# Patient Record
Sex: FEMALE | Race: WHITE | Employment: OTHER | URBAN - METROPOLITAN AREA
[De-identification: names, ages, dates, MRNs, and addresses within clinical notes are randomized per-mention and may not be internally consistent; named-entity substitution may affect disease eponyms.]

---

## 2019-05-24 ENCOUNTER — TELEPHONE (OUTPATIENT)
Dept: CARDIOLOGY | Age: 79
End: 2019-05-24

## 2019-05-24 NOTE — TELEPHONE ENCOUNTER
5/24/19 Message left on patient's home answering machine reminder of Nuclear stress test on 5/28 @9266.

## 2019-05-28 ENCOUNTER — HOSPITAL ENCOUNTER (OUTPATIENT)
Dept: CARDIOLOGY | Age: 79
Discharge: HOME | End: 2019-05-28
Payer: MEDICARE

## 2019-05-28 VITALS
DIASTOLIC BLOOD PRESSURE: 70 MMHG | SYSTOLIC BLOOD PRESSURE: 138 MMHG | BODY MASS INDEX: 23.9 KG/M2 | HEART RATE: 66 BPM | OXYGEN SATURATION: 96 % | HEIGHT: 64 IN | WEIGHT: 140 LBS

## 2019-05-28 DIAGNOSIS — R06.02 SOB (SHORTNESS OF BREATH): Primary | ICD-10-CM

## 2019-05-28 DIAGNOSIS — R94.31 ACUTE ELECTROCARDIOGRAM CHANGES: ICD-10-CM

## 2019-05-28 LAB
LV EF: 66 %
LVEF MODALITY: NORMAL

## 2019-05-28 PROCEDURE — A9500 TC99M SESTAMIBI: HCPCS | Performed by: INTERNAL MEDICINE

## 2019-05-28 PROCEDURE — 78452 HT MUSCLE IMAGE SPECT MULT: CPT

## 2019-05-28 PROCEDURE — 93017 CV STRESS TEST TRACING ONLY: CPT

## 2019-05-28 PROCEDURE — 3430000000 HC RX DIAGNOSTIC RADIOPHARMACEUTICAL: Performed by: INTERNAL MEDICINE

## 2019-05-28 PROCEDURE — 2580000003 HC RX 258: Performed by: INTERNAL MEDICINE

## 2019-05-28 RX ORDER — LANOLIN ALCOHOL/MO/W.PET/CERES
1000 CREAM (GRAM) TOPICAL DAILY
COMMUNITY

## 2019-05-28 RX ORDER — SODIUM CHLORIDE 0.9 % (FLUSH) 0.9 %
10 SYRINGE (ML) INJECTION PRN
Status: DISCONTINUED | OUTPATIENT
Start: 2019-05-28 | End: 2019-05-29 | Stop reason: HOSPADM

## 2019-05-28 RX ADMIN — Medication 33.9 MILLICURIE: at 10:17

## 2019-05-28 RX ADMIN — Medication 10 ML: at 10:17

## 2019-05-28 RX ADMIN — Medication 10 ML: at 08:59

## 2019-05-28 RX ADMIN — Medication 10.3 MILLICURIE: at 08:59

## 2019-05-28 NOTE — PROCEDURES
[id number] [address] [facility] [address]  [phone]                Exercise Stress Nuclear Gated SPECT Study    Name: [facility] Account Number: [de-identified]    :  1940      Sex: female              Date of Study:  2019    Height: 5' 4\" (162.6 cm)  Weight: 140 lb (63.5 kg)     Ordering Eliseo Shepard MD   PCP: Danyelle Ji MD      Cardiologist: None                        Interpreting Physician: Elsa Gill DO      Indication:   Detecting the presence and location of coronary artery disease    Clinical History:   Patient has no known history of coronary artery disease. Resting ECG:    Sinus rhythm, 61 bpm. Normal axis. Nonspecific ST-T waves. Exercise: The patient exercised using a John protocol, completing 5:30 minutes and reaching an estimated work load of 7.0 metabolic equivalents (METS). Resting HR was 61. Peak exercise heart rate was 128 ( 90% of maximum predicted heart rate for age). Baseline /70. Peak exercise /76. The blood pressure response to exercise was normal      Exercise was terminated due to increasing dyspnea, leg fatigue, and heart rate attained. The patient experienced no chest pain with exercise. Pulse oximetry was used to monitor oxygen saturation during the stress test.  The study was performed on Room Air. The resting pulse oximeter was 97%. The lowest O2 saturation seen during exercise was 96 %. The average O2 saturation with exercise was 96 %. Exercise ECG:   The patient demonstrated occasional PVC's during exercise. With exercise up to 2 mm of horizontal ST depression was noted in leads II, III, AVF, V4, V5 and V6 with initial changes beginning at 4:57 minutes into exercise, at a heart rate of 120. These changes these changes persisted 6 minutes into recovery period. The predictive value for ischemia was high\"}. Dean treadmill score was - 4.5 implying intermediate risk. IMAGING: Myocardial perfusion imaging was performed at rest 30-35 minutes following the intravenous injection of 10.3 mCi of (Tc-Sestamibi) followed by 10 ml of Normal Saline. At peak exercise, the patient was injected intravenously with 33.9 mCi of (Tc-Sestamibi) followed by 10 ml of Normal Saline. Gated post-stress tomographic imaging was performed 20-25 minutes after stress. FINDINGS: The overall quality of the study was good. Left ventricular cavity size was noted to be normal.    Rotational analog analysis demonstrated soft tissue breast attenuation. The gated SPECT stress imaging in the short, vertical long, and horizontal long axis demonstrated normal homogeneous tracer distribution throughout the myocardium. Gated SPECT left ventricular ejection fraction was calculated to be 66%, with normal myocardial thickening and wall motion. Impression:    1. Exercise EKG was    positive with high predictive value for ischemia. 2. The patient experienced no chest pain with exercise. 3. The myocardial perfusion imaging was normal.      4. Overall left ventricular systolic function was normal without regional wall motion abnormalities. 5. Dean treadmill score was - 4.5 implying intermediate risk. 6. Exercise capacity was average. 7. Intermediate risk general exercise treadmill test.    Thank you for sending your patient to this [employer].      Electronically signed by Peter Arnold DO on 5/28/19 at 2:25 PM

## 2019-05-29 ENCOUNTER — TELEPHONE (OUTPATIENT)
Dept: ADMINISTRATIVE | Age: 79
End: 2019-05-29

## 2019-05-29 NOTE — TELEPHONE ENCOUNTER
Cardiology doctors reviewed today and they stated that this can wait until 1st available next week. Appointment made for Radha 3, 2019 at 8:15 a.m. With Dr. Cornell Hammond. Patient cannot recall exactly what cardiologist she saw about 20 yrs ago. Dr. Srinivas De La Paz notes in [address] [place] [address] Rd.

## 2019-05-31 PROBLEM — R94.39 ABNORMAL STRESS TEST: Status: ACTIVE | Noted: 2019-05-31

## 2019-06-03 ENCOUNTER — OFFICE VISIT (OUTPATIENT)
Dept: CARDIOLOGY CLINIC | Age: 79
End: 2019-06-03
Payer: MEDICARE

## 2019-06-03 VITALS
BODY MASS INDEX: 23.73 KG/M2 | HEIGHT: 64 IN | HEART RATE: 61 BPM | DIASTOLIC BLOOD PRESSURE: 70 MMHG | WEIGHT: 139 LBS | SYSTOLIC BLOOD PRESSURE: 110 MMHG | RESPIRATION RATE: 14 BRPM

## 2019-06-03 DIAGNOSIS — R94.39 ABNORMAL STRESS TEST: ICD-10-CM

## 2019-06-03 PROCEDURE — 93000 ELECTROCARDIOGRAM COMPLETE: CPT | Performed by: INTERNAL MEDICINE

## 2019-06-03 PROCEDURE — 99205 OFFICE O/P NEW HI 60 MIN: CPT | Performed by: INTERNAL MEDICINE

## 2019-06-03 NOTE — PROGRESS NOTES
Chief Complaint   Patient presents with    Chest Pain       Patient Active Problem List    Diagnosis Date Noted    Abnormal stress test 05/31/2019    Hyperlipidemia 03/07/2016    Hypertension 03/07/2016    Depression 03/07/2016    Anxiety 03/07/2016       Current Outpatient Medications   Medication Sig Dispense Refill    vitamin B-12 (CYANOCOBALAMIN) 1000 MCG tablet Take 1,000 mcg by mouth daily      Cyanocobalamin 1000 MCG/ML KIT Inject as directed every 30 days Receives at Dr. Sewell [facility] office      escitalopram (LEXAPRO) 10 MG tablet Take 1 tablet by mouth daily 90 tablet 1    simvastatin (ZOCOR) 40 MG tablet Take 1 tablet by mouth every evening 90 tablet 1    propranolol-hydrochlorothiazide (INDERIDE) 80-25 MG per tablet Take 1 tablet by mouth daily 90 tablet 1    zolpidem (AMBIEN) 10 MG tablet Take 1 tablet by mouth nightly as needed for Sleep 90 tablet 0    docusate sodium (COLACE) 100 MG capsule Take 100 mg by mouth daily       multivitamin (THERAGRAN) per tablet Take 1 tablet by mouth daily. Last dose to be 1-23-13      aspirin 81 MG tablet Take 81 mg by mouth daily. Last taken 1-20-13      Calcium Carbonate-Vit D-Min (CALCIUM 1200 PO) Take 1 tablet by mouth daily.  fluticasone (FLONASE) 50 MCG/ACT nasal spray 2 sprays by Nasal route daily 1 Bottle 1    HYDROcodone-acetaminophen (NORCO) 5-325 MG per tablet Take 1 tablet by mouth daily as needed for Pain for up to 6 days. 6 tablet 0     No current facility-administered medications for this visit.          No Known Allergies    Vitals:    06/03/19 0835   BP: 110/70   Pulse: 61   Resp: 14   Weight: 139 lb (63 kg)   Height: 5' 4\" (1.626 m)       Social History     Socioeconomic History    Marital status:      Spouse name: Not on file    Number of children: Not on file    Years of education: Not on file    Highest education level: Not on file   Occupational History    Not on file   Social Needs    Financial resource strain: Not on file    Food insecurity:     Worry: Not on file     Inability: Not on file    Transportation needs:     Medical: Not on file     Non-medical: Not on file   Tobacco Use    Smoking status: Former Smoker     Types: Cigarettes    Smokeless tobacco: Never Used    Tobacco comment: quit 2008   Substance and Sexual Activity    Alcohol use: No    Drug use: No    Sexual activity: Not on file   Lifestyle    Physical activity:     Days per week: Not on file     Minutes per session: Not on file    Stress: Not on file   Relationships    Social connections:     Talks on phone: Not on file     Gets together: Not on file     Attends Yazidi service: Not on file     Active member of club or organization: Not on file     Attends meetings of clubs or organizations: Not on file     Relationship status: Not on file    Intimate partner violence:     Fear of current or ex partner: Not on file     Emotionally abused: Not on file     Physically abused: Not on file     Forced sexual activity: Not on file   Other Topics Concern    Not on file   Social History Narrative    Not on file       Family History   Problem Relation Age of Onset    Breast Cancer Mother     Coronary Art Dis Mother     Stroke Father     No Known Problems Sister     No Known Problems Sister     Diabetes Sister     Kidney Disease Sister          SUBJECTIVE: Reyna Navarro presents to the office today for consult - dr lópez- for evaluation of sob and abnormall stress. She complains of dyspnea and denies   exertional chest pressure/discomfort, lower extremity edema, near-syncope, orthopnea, palpitations, paroxysmal nocturnal dyspnea and syncope. , cares for entire home, including her daughter's 3 story home, cuts the grass and does all her own yard work   has had episodes of sob at times with far less acitivty, like talking on phone. Had normal cxr.   Risk factors htn and hld and ex cigs  Has been cutting her meds to qod due to cost. Review of Systems:   Heart: as above   Lungs: as above   Eyes: denies changes in vision or discharge. Ears: denies changes in hearing or pain. Nose: denies epistaxis or masses   Throat: denies sore throat or trouble swallowing. Neuro: denies numbness, tingling, tremors. Skin: denies rashes or itching. : denies hematuria, dysuria   GI: denies vomiting, diarrhea   Psych: denies mood changed, anxiety, depression. all others negative. Physical Exam   /70   Pulse 61   Resp 14   Ht 5' 4\" (1.626 m)   Wt 139 lb (63 kg)   BMI 23.86 kg/m²   Constitutional: Oriented to person, place, and time. Well-developed and well-nourished. No distress. Head: Normocephalic and atraumatic. Eyes: EOM are normal. Pupils are equal, round, and reactive to light. Neck: Normal range of motion. Neck supple. No hepatojugular reflux and no JVD present. Carotid bruit is not present. No tracheal deviation present. No thyromegaly present. Cardiovascular: Normal rate, regular rhythm, normal heart sounds and intact distal pulses. Exam reveals no gallop and no friction rub. No murmur heard. Pulmonary/Chest: Effort normal and breath sounds normal. No respiratory distress. No wheezes. No rales. No tenderness. Abdominal: Soft. Bowel sounds are normal. No distension and no mass. No tenderness. No rebound and no guarding. Musculoskeletal: Normal range of motion. No edema and no tenderness. Lymphadenopathy:   No cervical adenopathy. No groin adenopathy. Neurological: Alert and oriented to person, place, and time. Skin: Skin is warm and dry. No rash noted. Not diaphoretic. No erythema. Psychiatric: Normal mood and affect. Behavior is normal.     EKG:  normal sinus rhythm, 1st degree AV block.     ASSESSMENT AND PLAN:  Patient Active Problem List   Diagnosis    Hyperlipidemia    Hypertension    Depression    Anxiety    Abnormal stress test     Patient with likely CAD given age, stress and risk factors, but above average funcitonal capacity on stress, no unstable symptoms  Long disucssion of conservative management vs cath wth its attendant risks  She wishes to avoid cath at this time which is not unreasonable  Needs to be compliant with statin, and resample LDL in two months. If > 70 should change to high intensity regimen    The patient was educated as to the symptoms that would require a prompt return to our office. Return visit in 3 months.     Charline Pollard M.D  [facility]

## 2019-06-27 ENCOUNTER — TELEPHONE (OUTPATIENT)
Dept: ADMINISTRATIVE | Age: 79
End: 2019-06-27

## 2019-06-27 ENCOUNTER — TELEPHONE (OUTPATIENT)
Dept: CARDIOLOGY CLINIC | Age: 79
End: 2019-06-27

## 2019-06-27 DIAGNOSIS — R06.02 SHORTNESS OF BREATH: ICD-10-CM

## 2019-06-27 DIAGNOSIS — R94.39 ABNORMAL STRESS TEST: ICD-10-CM

## 2019-06-27 DIAGNOSIS — Z01.810 PREOPERATIVE CARDIOVASCULAR EXAMINATION: Primary | ICD-10-CM

## 2019-06-27 NOTE — TELEPHONE ENCOUNTER
Patient called stated Dr. Martinez Morton gave her a choice to have cardiac catherization done now or she could wait, patient states she is having increased SOB and dizziness and changed her mind would like to schedule catherization, she can be reached at [phone].

## 2019-07-03 ENCOUNTER — HOSPITAL ENCOUNTER (OUTPATIENT)
Age: 79
Discharge: HOME | End: 2019-07-03
Payer: MEDICARE

## 2019-07-03 DIAGNOSIS — R94.39 ABNORMAL STRESS TEST: ICD-10-CM

## 2019-07-03 DIAGNOSIS — R06.02 SHORTNESS OF BREATH: ICD-10-CM

## 2019-07-03 DIAGNOSIS — Z01.810 PREOPERATIVE CARDIOVASCULAR EXAMINATION: ICD-10-CM

## 2019-07-03 LAB
ALBUMIN SERPL-MCNC: 4.3 G/DL (ref 3.5–5.2)
ALP BLD-CCNC: 62 U/L (ref 35–104)
ALT SERPL-CCNC: 17 U/L (ref 0–32)
ANION GAP SERPL CALCULATED.3IONS-SCNC: 10 MMOL/L (ref 7–16)
AST SERPL-CCNC: 20 U/L (ref 0–31)
BILIRUB SERPL-MCNC: 0.3 MG/DL (ref 0–1.2)
BUN BLDV-MCNC: 17 MG/DL (ref 8–23)
CALCIUM SERPL-MCNC: 10.2 MG/DL (ref 8.6–10.2)
CHLORIDE BLD-SCNC: 105 MMOL/L (ref 98–107)
CO2: 29 MMOL/L (ref 22–29)
CREAT SERPL-MCNC: 1 MG/DL (ref 0.5–1)
GFR AFRICAN AMERICAN: >60
GFR NON-AFRICAN AMERICAN: 54 ML/MIN/1.73
GLUCOSE BLD-MCNC: 76 MG/DL (ref 74–99)
HCT VFR BLD CALC: 35.9 % (ref 34–48)
HEMOGLOBIN: 12 G/DL (ref 11.5–15.5)
INR BLD: 1.1
MCH RBC QN AUTO: 33.3 PG (ref 26–35)
MCHC RBC AUTO-ENTMCNC: 33.4 % (ref 32–34.5)
MCV RBC AUTO: 99.7 FL (ref 80–99.9)
PDW BLD-RTO: 13.2 FL (ref 11.5–15)
PLATELET # BLD: 204 E9/L (ref 130–450)
PMV BLD AUTO: 10.7 FL (ref 7–12)
POTASSIUM SERPL-SCNC: 3.5 MMOL/L (ref 3.5–5)
PROTHROMBIN TIME: 12.6 SEC (ref 9.3–12.4)
RBC # BLD: 3.6 E12/L (ref 3.5–5.5)
SODIUM BLD-SCNC: 144 MMOL/L (ref 132–146)
TOTAL PROTEIN: 7.2 G/DL (ref 6.4–8.3)
WBC # BLD: 5.4 E9/L (ref 4.5–11.5)

## 2019-07-03 PROCEDURE — 85610 PROTHROMBIN TIME: CPT

## 2019-07-03 PROCEDURE — 85027 COMPLETE CBC AUTOMATED: CPT

## 2019-07-03 PROCEDURE — 80053 COMPREHEN METABOLIC PANEL: CPT

## 2019-07-03 PROCEDURE — 36415 COLL VENOUS BLD VENIPUNCTURE: CPT

## 2019-07-08 ENCOUNTER — TELEPHONE (OUTPATIENT)
Dept: CARDIAC CATH/INVASIVE PROCEDURES | Age: 79
End: 2019-07-08

## 2019-07-09 ENCOUNTER — HOSPITAL ENCOUNTER (OUTPATIENT)
Dept: CARDIAC CATH/INVASIVE PROCEDURES | Age: 79
Setting detail: OBSERVATION
Discharge: HOME | End: 2019-07-10
Attending: INTERNAL MEDICINE | Admitting: INTERNAL MEDICINE
Payer: MEDICARE

## 2019-07-09 DIAGNOSIS — I25.10 CAD IN NATIVE ARTERY: ICD-10-CM

## 2019-07-09 PROBLEM — Z98.61 POST PTCA: Status: ACTIVE | Noted: 2019-07-09

## 2019-07-09 LAB
ABO/RH: NORMAL
ANTIBODY SCREEN: NORMAL
EKG ATRIAL RATE: 57 BPM
EKG P AXIS: 51 DEGREES
EKG P-R INTERVAL: 234 MS
EKG Q-T INTERVAL: 428 MS
EKG QRS DURATION: 82 MS
EKG QTC CALCULATION (BAZETT): 416 MS
EKG R AXIS: 8 DEGREES
EKG T AXIS: 50 DEGREES
EKG VENTRICULAR RATE: 57 BPM
POC ACT LR: 252 SECONDS
POC ACT LR: 354 SECONDS

## 2019-07-09 PROCEDURE — 92928 PRQ TCAT PLMT NTRAC ST 1 LES: CPT | Performed by: INTERNAL MEDICINE

## 2019-07-09 PROCEDURE — G0378 HOSPITAL OBSERVATION PER HR: HCPCS

## 2019-07-09 PROCEDURE — C1769 GUIDE WIRE: HCPCS

## 2019-07-09 PROCEDURE — 6370000000 HC RX 637 (ALT 250 FOR IP): Performed by: INTERNAL MEDICINE

## 2019-07-09 PROCEDURE — C1725 CATH, TRANSLUMIN NON-LASER: HCPCS

## 2019-07-09 PROCEDURE — 86901 BLOOD TYPING SEROLOGIC RH(D): CPT

## 2019-07-09 PROCEDURE — 86900 BLOOD TYPING SEROLOGIC ABO: CPT

## 2019-07-09 PROCEDURE — C1874 STENT, COATED/COV W/DEL SYS: HCPCS

## 2019-07-09 PROCEDURE — C1894 INTRO/SHEATH, NON-LASER: HCPCS

## 2019-07-09 PROCEDURE — 93005 ELECTROCARDIOGRAM TRACING: CPT | Performed by: INTERNAL MEDICINE

## 2019-07-09 PROCEDURE — 36415 COLL VENOUS BLD VENIPUNCTURE: CPT

## 2019-07-09 PROCEDURE — C9600 PERC DRUG-EL COR STENT SING: HCPCS | Performed by: INTERNAL MEDICINE

## 2019-07-09 PROCEDURE — 2709999900 HC NON-CHARGEABLE SUPPLY

## 2019-07-09 PROCEDURE — 93458 L HRT ARTERY/VENTRICLE ANGIO: CPT | Performed by: INTERNAL MEDICINE

## 2019-07-09 PROCEDURE — 85347 COAGULATION TIME ACTIVATED: CPT

## 2019-07-09 PROCEDURE — 6360000002 HC RX W HCPCS

## 2019-07-09 PROCEDURE — 2500000003 HC RX 250 WO HCPCS

## 2019-07-09 PROCEDURE — 6370000000 HC RX 637 (ALT 250 FOR IP)

## 2019-07-09 PROCEDURE — C1887 CATHETER, GUIDING: HCPCS

## 2019-07-09 PROCEDURE — 86850 RBC ANTIBODY SCREEN: CPT

## 2019-07-09 RX ORDER — ASPIRIN 81 MG/1
81 TABLET, CHEWABLE ORAL DAILY
Status: DISCONTINUED | OUTPATIENT
Start: 2019-07-10 | End: 2019-07-09 | Stop reason: SDUPTHER

## 2019-07-09 RX ORDER — FLUTICASONE PROPIONATE 50 MCG
2 SPRAY, SUSPENSION (ML) NASAL DAILY
Status: DISCONTINUED | OUTPATIENT
Start: 2019-07-09 | End: 2019-07-10 | Stop reason: HOSPADM

## 2019-07-09 RX ORDER — PROPRANOLOL HYDROCHLORIDE AND HYDROCHLOROTHIAZIDE 80; 25 MG/1; MG/1
1 TABLET ORAL DAILY
Status: DISCONTINUED | OUTPATIENT
Start: 2019-07-09 | End: 2019-07-09 | Stop reason: CLARIF

## 2019-07-09 RX ORDER — PROPRANOLOL HYDROCHLORIDE 20 MG/1
80 TABLET ORAL DAILY
Status: DISCONTINUED | OUTPATIENT
Start: 2019-07-09 | End: 2019-07-10 | Stop reason: HOSPADM

## 2019-07-09 RX ORDER — OYSTER SHELL CALCIUM WITH VITAMIN D 500; 200 MG/1; [IU]/1
1 TABLET, FILM COATED ORAL DAILY
Status: DISCONTINUED | OUTPATIENT
Start: 2019-07-09 | End: 2019-07-10 | Stop reason: HOSPADM

## 2019-07-09 RX ORDER — LANOLIN ALCOHOL/MO/W.PET/CERES
1000 CREAM (GRAM) TOPICAL DAILY
Status: DISCONTINUED | OUTPATIENT
Start: 2019-07-09 | End: 2019-07-10 | Stop reason: HOSPADM

## 2019-07-09 RX ORDER — SIMVASTATIN 40 MG
40 TABLET ORAL EVERY EVENING
Status: DISCONTINUED | OUTPATIENT
Start: 2019-07-09 | End: 2019-07-10 | Stop reason: HOSPADM

## 2019-07-09 RX ORDER — ZOLPIDEM TARTRATE 5 MG/1
10 TABLET ORAL NIGHTLY PRN
Status: DISCONTINUED | OUTPATIENT
Start: 2019-07-09 | End: 2019-07-10 | Stop reason: HOSPADM

## 2019-07-09 RX ORDER — ESCITALOPRAM OXALATE 10 MG/1
10 TABLET ORAL DAILY
Status: DISCONTINUED | OUTPATIENT
Start: 2019-07-09 | End: 2019-07-10 | Stop reason: HOSPADM

## 2019-07-09 RX ORDER — ASPIRIN 81 MG/1
81 TABLET ORAL DAILY
Status: DISCONTINUED | OUTPATIENT
Start: 2019-07-10 | End: 2019-07-10 | Stop reason: HOSPADM

## 2019-07-09 RX ORDER — MULTIVITAMIN WITH FOLIC ACID 400 MCG
1 TABLET ORAL DAILY
Status: DISCONTINUED | OUTPATIENT
Start: 2019-07-09 | End: 2019-07-10 | Stop reason: HOSPADM

## 2019-07-09 RX ORDER — HYDROCHLOROTHIAZIDE 25 MG/1
25 TABLET ORAL DAILY
Status: DISCONTINUED | OUTPATIENT
Start: 2019-07-09 | End: 2019-07-10 | Stop reason: HOSPADM

## 2019-07-09 RX ADMIN — TICAGRELOR 90 MG: 90 TABLET ORAL at 22:01

## 2019-07-09 RX ADMIN — SIMVASTATIN 40 MG: 40 TABLET, FILM COATED ORAL at 22:01

## 2019-07-09 RX ADMIN — Medication 1000 MCG: at 22:01

## 2019-07-09 RX ADMIN — MULTIVITAMIN TABLET 1 TABLET: TABLET at 17:31

## 2019-07-09 RX ADMIN — CALCIUM CARBONATE-VITAMIN D TAB 500 MG-200 UNIT 1 TABLET: 500-200 TAB at 17:32

## 2019-07-09 RX ADMIN — ZOLPIDEM TARTRATE 10 MG: 5 TABLET ORAL at 22:01

## 2019-07-09 ASSESSMENT — PAIN SCALES - GENERAL
PAINLEVEL_OUTOF10: 0
PAINLEVEL_OUTOF10: 0

## 2019-07-09 NOTE — OP NOTE
Indication:    1. CCS III ANGINA ON MAXIMUM MEDICAL THERAPY, ABNORMAL STRESS. Procedure: Left Heart Catheterization, coronary angiography, left ventriculography, percutaneous coronary intervention to RCA, percutaneous coronary intervention to CIRCUMFLEX      Anesthesia: Versed, Fentanyl, BENADRYL  Time sedation was administered: 0730 I was present in the room when sedation was administered. Procedure end time: 0818  Time spent with face to face monitoring of moderate sedation: 50 MIN    Cardiac cath performed via RIGHT RADIAL approach using SLENDER 6 sheath. Findings:   Left main: 0% stenosis  LAD: MODERATE DISEASE  Circumflex: 80%  MID stenosis. RCA: Dominant. HEAVILY CALCIFICED 80 % MID stenosis. LV angio: 70%  ejection fraction      Hemodynamics: Ao: 124/61  LV: 120  LVEDP: 18    Interventional procedure:   1. PCI vessel: RCA. Lesion type: C. JUSTINE III flow pre PCI. SEE PROCEDURE PORTION  Result: 0% residual stenosis and JUSTINE III distal flow. 2. PCI vessel:   CIRCUMFLEX  Lesion type C  JUSTINE III flow pre PCI  SEE PROCEDURE PORTION  Result: 0% residual stenosis with JUSTINE III distal flow. Drugs: . Anticoagulation was maintained with HEPARIN . BRILINTA load given  in cath lab. Right RADIAL sheath: PULLED AND TR BAND APPLIED. There was good distal pulses in the RUE at the end of the procedure. Complication: None   Blood loss: 10 CC  Contrast used: 123cc      Post op diagnosis:  MULTIVESSEL PCI TO RCA AND CIRCUMFLEX    Plan:  DAPT  Risk profile modification.    See orders

## 2019-07-10 VITALS
OXYGEN SATURATION: 96 % | DIASTOLIC BLOOD PRESSURE: 56 MMHG | SYSTOLIC BLOOD PRESSURE: 119 MMHG | TEMPERATURE: 96.7 F | HEIGHT: 64 IN | RESPIRATION RATE: 16 BRPM | HEART RATE: 79 BPM | BODY MASS INDEX: 23.9 KG/M2 | WEIGHT: 140 LBS

## 2019-07-10 LAB
ANION GAP SERPL CALCULATED.3IONS-SCNC: 14 MMOL/L (ref 7–16)
BUN BLDV-MCNC: 21 MG/DL (ref 8–23)
CALCIUM SERPL-MCNC: 9.8 MG/DL (ref 8.6–10.2)
CHLORIDE BLD-SCNC: 104 MMOL/L (ref 98–107)
CO2: 24 MMOL/L (ref 22–29)
CREAT SERPL-MCNC: 1.1 MG/DL (ref 0.5–1)
EKG ATRIAL RATE: 78 BPM
EKG Q-T INTERVAL: 398 MS
EKG QRS DURATION: 112 MS
EKG QTC CALCULATION (BAZETT): 453 MS
EKG R AXIS: 66 DEGREES
EKG T AXIS: -65 DEGREES
EKG VENTRICULAR RATE: 78 BPM
GFR AFRICAN AMERICAN: 58
GFR NON-AFRICAN AMERICAN: 48 ML/MIN/1.73
GLUCOSE BLD-MCNC: 106 MG/DL (ref 74–99)
POTASSIUM SERPL-SCNC: 4 MMOL/L (ref 3.5–5)
SODIUM BLD-SCNC: 142 MMOL/L (ref 132–146)

## 2019-07-10 PROCEDURE — 6370000000 HC RX 637 (ALT 250 FOR IP): Performed by: INTERNAL MEDICINE

## 2019-07-10 PROCEDURE — G0378 HOSPITAL OBSERVATION PER HR: HCPCS

## 2019-07-10 PROCEDURE — 93005 ELECTROCARDIOGRAM TRACING: CPT | Performed by: INTERNAL MEDICINE

## 2019-07-10 PROCEDURE — 6370000000 HC RX 637 (ALT 250 FOR IP): Performed by: PSYCHIATRY & NEUROLOGY

## 2019-07-10 PROCEDURE — 99214 OFFICE O/P EST MOD 30 MIN: CPT | Performed by: INTERNAL MEDICINE

## 2019-07-10 PROCEDURE — 36415 COLL VENOUS BLD VENIPUNCTURE: CPT

## 2019-07-10 PROCEDURE — 80048 BASIC METABOLIC PNL TOTAL CA: CPT

## 2019-07-10 RX ADMIN — PROPRANOLOL HYDROCHLORIDE 80 MG: 20 TABLET ORAL at 09:04

## 2019-07-10 RX ADMIN — ASPIRIN 81 MG: 81 TABLET ORAL at 09:04

## 2019-07-10 RX ADMIN — HYDROCHLOROTHIAZIDE 25 MG: 25 TABLET ORAL at 09:04

## 2019-07-10 RX ADMIN — Medication 1000 MCG: at 09:04

## 2019-07-10 RX ADMIN — CALCIUM CARBONATE-VITAMIN D TAB 500 MG-200 UNIT 1 TABLET: 500-200 TAB at 09:04

## 2019-07-10 RX ADMIN — FLUTICASONE PROPIONATE 2 SPRAY: 50 SPRAY, METERED NASAL at 09:05

## 2019-07-10 RX ADMIN — ESCITALOPRAM 10 MG: 10 TABLET, FILM COATED ORAL at 09:04

## 2019-07-10 RX ADMIN — MULTIVITAMIN TABLET 1 TABLET: TABLET at 09:04

## 2019-07-10 RX ADMIN — TICAGRELOR 90 MG: 90 TABLET ORAL at 09:05

## 2019-07-10 ASSESSMENT — PAIN SCALES - GENERAL
PAINLEVEL_OUTOF10: 0
PAINLEVEL_OUTOF10: 0

## 2019-07-10 NOTE — PLAN OF CARE
Assist patient when standing for the first time, going to the bathroom. Question as to lightheadedness, vertigo, weak legs. Make sure pathways are clear of obstacles, water, cords etc.  Patient does very well when up and about, does most of the moving on her own and has good balance. Keep wound / puncture site clean and dry. Remove dressing within 24 hours if a groin site and the next am if a chest site. Keep open to air, or cover with Band-Aid until healed on a daily basis. Note any drainage, type, color, odor  Check temperature for fever  Blood cultures if ordered. Talked about cleaning with dial soap, gently, pat dry, air dry -   Watch for signs of infection, call  If one is suspected.

## 2019-07-31 ENCOUNTER — OFFICE VISIT (OUTPATIENT)
Dept: CARDIOLOGY CLINIC | Age: 79
End: 2019-07-31
Payer: MEDICARE

## 2019-07-31 VITALS
HEART RATE: 67 BPM | HEIGHT: 64 IN | DIASTOLIC BLOOD PRESSURE: 78 MMHG | BODY MASS INDEX: 22.71 KG/M2 | WEIGHT: 133 LBS | RESPIRATION RATE: 16 BRPM | SYSTOLIC BLOOD PRESSURE: 130 MMHG

## 2019-07-31 DIAGNOSIS — R06.02 SOB (SHORTNESS OF BREATH): Primary | ICD-10-CM

## 2019-07-31 PROCEDURE — 99214 OFFICE O/P EST MOD 30 MIN: CPT | Performed by: INTERNAL MEDICINE

## 2019-07-31 PROCEDURE — 93000 ELECTROCARDIOGRAM COMPLETE: CPT | Performed by: INTERNAL MEDICINE

## 2019-07-31 NOTE — PROGRESS NOTES
Chief Complaint   Patient presents with    Coronary Artery Disease       Patient Active Problem List    Diagnosis Date Noted    CAD in native artery 07/09/2019     Overview Note:     A. PCI 7/9/2019:  RAMYA 3.0 in mid RCA, two RAMYA 2.5 in mid CX. EF 70%      Mixed hyperlipidemia 03/07/2016    Hypertension 03/07/2016    Depression 03/07/2016       Current Outpatient Medications   Medication Sig Dispense Refill    ticagrelor (BRILINTA) 90 MG TABS tablet Take 1 tablet by mouth 2 times daily 60 tablet 1    vitamin B-12 (CYANOCOBALAMIN) 1000 MCG tablet Take 1,000 mcg by mouth daily      Cyanocobalamin 1000 MCG/ML KIT Inject as directed every 30 days Receives at Dr. Osiris Hall office      fluticasone (FLONASE) 50 MCG/ACT nasal spray 2 sprays by Nasal route daily 1 Bottle 1    escitalopram (LEXAPRO) 10 MG tablet Take 1 tablet by mouth daily 90 tablet 1    propranolol-hydrochlorothiazide (INDERIDE) 80-25 MG per tablet Take 1 tablet by mouth daily 90 tablet 1    zolpidem (AMBIEN) 10 MG tablet Take 1 tablet by mouth nightly as needed for Sleep 90 tablet 0    docusate sodium (COLACE) 100 MG capsule Take 100 mg by mouth daily       multivitamin (THERAGRAN) per tablet Take 1 tablet by mouth daily. Last dose to be 1-23-13      aspirin 81 MG tablet Take 81 mg by mouth daily. Last taken 1-20-13      Calcium Carbonate-Vit D-Min (CALCIUM 1200 PO) Take 1 tablet by mouth daily.  atorvastatin (LIPITOR) 40 MG tablet Take 1 tablet by mouth daily (Patient not taking: Reported on 7/31/2019) 90 tablet 3    HYDROcodone-acetaminophen (NORCO) 5-325 MG per tablet Take 1 tablet by mouth daily as needed for Pain for up to 30 days. 30 tablet 0    simvastatin (ZOCOR) 40 MG tablet Take 1 tablet by mouth every evening (Patient not taking: Reported on 7/31/2019) 90 tablet 1     No current facility-administered medications for this visit.          No Known Allergies    Vitals:    07/31/19 1304   BP: 130/78   Pulse: 67   Resp: 16 PLAN:  Patient Active Problem List   Diagnosis    Mixed hyperlipidemia: on high intensity statin, target LDL < 70    Hypertension: at target for age   [facility] Depression    CAD in native artery: multivessel PCI 7/2019 for medical failure. Now free of angina. Continue DAPT for 6-12 months, but can change out to Plavix for cost reasons if she wishes. The patient was educated as to the symptoms that would require a prompt return to our office. Return visit in 6 months.     Nellie Banks M.D  [facility]

## 2019-08-06 RX ORDER — CLOPIDOGREL BISULFATE 75 MG/1
75 TABLET ORAL DAILY
Qty: 30 TABLET | Refills: 5 | Status: SHIPPED
Start: 2019-08-06 | End: 2020-03-09 | Stop reason: SDUPTHER

## 2020-02-03 ENCOUNTER — OFFICE VISIT (OUTPATIENT)
Dept: CARDIOLOGY CLINIC | Age: 80
End: 2020-02-03
Payer: MEDICARE

## 2020-02-03 VITALS
HEIGHT: 64 IN | HEART RATE: 77 BPM | SYSTOLIC BLOOD PRESSURE: 112 MMHG | RESPIRATION RATE: 16 BRPM | DIASTOLIC BLOOD PRESSURE: 68 MMHG | WEIGHT: 140 LBS | BODY MASS INDEX: 23.9 KG/M2

## 2020-02-03 PROCEDURE — 99213 OFFICE O/P EST LOW 20 MIN: CPT | Performed by: INTERNAL MEDICINE

## 2020-02-03 PROCEDURE — 93000 ELECTROCARDIOGRAM COMPLETE: CPT | Performed by: INTERNAL MEDICINE

## 2020-02-03 NOTE — PROGRESS NOTES
Chief Complaint   Patient presents with    Coronary Artery Disease       Patient Active Problem List    Diagnosis Date Noted    CAD in native artery 07/09/2019     Overview Note:     A. PCI 7/9/2019:  RAMYA 3.0 in mid RCA, two RAMYA 2.5 in mid CX. EF 70%      Mixed hyperlipidemia 03/07/2016    Hypertension 03/07/2016    Depression 03/07/2016       Current Outpatient Medications   Medication Sig Dispense Refill    zolpidem (AMBIEN) 10 MG tablet Take 1 tablet by mouth nightly as needed for Sleep for up to 30 days. 30 tablet 0    escitalopram (LEXAPRO) 10 MG tablet Take 1 tablet by mouth daily 90 tablet 1    HYDROcodone-acetaminophen (NORCO) 5-325 MG per tablet Take 1 tablet by mouth daily as needed for Pain for up to 30 days. 30 tablet 0    propranolol-hydrochlorothiazide (INDERIDE) 80-25 MG per tablet Take 1 tablet by mouth daily 90 tablet 0    atorvastatin (LIPITOR) 40 MG tablet Take 1 tablet by mouth daily 90 tablet 0    Potassium 95 MG TABS Take 1 tablet by mouth daily      clopidogrel (PLAVIX) 75 MG tablet Take 1 tablet by mouth daily 30 tablet 5    vitamin B-12 (CYANOCOBALAMIN) 1000 MCG tablet Take 1,000 mcg by mouth daily      Cyanocobalamin 1000 MCG/ML KIT Inject as directed every 30 days Receives at Dr. Elver Miranda office      fluticasone (FLONASE) 50 MCG/ACT nasal spray 2 sprays by Nasal route daily 1 Bottle 1    docusate sodium (COLACE) 100 MG capsule Take 100 mg by mouth daily       multivitamin (THERAGRAN) per tablet Take 1 tablet by mouth daily. Last dose to be 1-23-13      aspirin 81 MG tablet Take 81 mg by mouth daily. Last taken 1-20-13      Calcium Carbonate-Vit D-Min (CALCIUM 1200 PO) Take 1 tablet by mouth daily. No current facility-administered medications for this visit.          No Known Allergies    Vitals:    02/03/20 0920   BP: 112/68   Pulse: 77   Resp: 16   Weight: 140 lb (63.5 kg)   Height: 5' 4\" (1.626 m)       Social History     Socioeconomic History    Marital

## 2020-08-03 ENCOUNTER — OFFICE VISIT (OUTPATIENT)
Dept: CARDIOLOGY CLINIC | Age: 80
End: 2020-08-03
Payer: MEDICARE

## 2020-08-03 VITALS
BODY MASS INDEX: 23.12 KG/M2 | DIASTOLIC BLOOD PRESSURE: 90 MMHG | SYSTOLIC BLOOD PRESSURE: 130 MMHG | HEART RATE: 63 BPM | WEIGHT: 135.4 LBS | HEIGHT: 64 IN | TEMPERATURE: 96.9 F

## 2020-08-03 PROCEDURE — 99213 OFFICE O/P EST LOW 20 MIN: CPT | Performed by: INTERNAL MEDICINE

## 2020-08-03 PROCEDURE — 93000 ELECTROCARDIOGRAM COMPLETE: CPT | Performed by: INTERNAL MEDICINE

## 2020-08-03 NOTE — PROGRESS NOTES
Chief Complaint   Patient presents with    Coronary Artery Disease    Hyperlipidemia       Patient Active Problem List    Diagnosis Date Noted    CAD in native artery 07/09/2019     Overview Note:     A. PCI 7/9/2019:  RAMYA 3.0 in mid RCA, two RAMYA 2.5 in mid CX. EF 70%      Mixed hyperlipidemia 03/07/2016    Hypertension 03/07/2016    Depression 03/07/2016       Current Outpatient Medications   Medication Sig Dispense Refill    zolpidem (AMBIEN) 10 MG tablet Take 1 tablet by mouth nightly as needed for Sleep for up to 30 days. (Patient taking differently: Take 5 mg by mouth nightly as needed for Sleep. ) 30 tablet 0    atenolol-chlorthalidone (TENORETIC) 50-25 MG per tablet Take 1 tablet by mouth daily 90 tablet 0    HYDROcodone-acetaminophen (NORCO) 5-325 MG per tablet Take 1 tablet by mouth daily as needed for Pain for up to 30 days. 30 tablet 0    atenolol-chlorthalidone (TENORETIC) 50-25 MG per tablet Take 1 tablet by mouth daily 90 tablet 0    clopidogrel (PLAVIX) 75 MG tablet Take 1 tablet by mouth daily 30 tablet 3    atorvastatin (LIPITOR) 80 MG tablet Take 1 tablet by mouth daily 30 tablet 5    escitalopram (LEXAPRO) 10 MG tablet Take 1 tablet by mouth daily 90 tablet 1    vitamin B-12 (CYANOCOBALAMIN) 1000 MCG tablet Take 1,000 mcg by mouth daily      Cyanocobalamin 1000 MCG/ML KIT Inject as directed every 30 days Receives at Dr. Walsh Needs office      fluticasone (FLONASE) 50 MCG/ACT nasal spray 2 sprays by Nasal route daily 1 Bottle 1    multivitamin (THERAGRAN) per tablet Take 1 tablet by mouth daily. Last dose to be 1-23-13      aspirin 81 MG tablet Take 81 mg by mouth daily. Last taken 1-20-13      Calcium Carbonate-Vit D-Min (CALCIUM 1200 PO) Take 1 tablet by mouth daily.  Potassium 95 MG TABS Take 1 tablet by mouth daily      docusate sodium (COLACE) 100 MG capsule Take 100 mg by mouth daily        No current facility-administered medications for this visit.          No Known Allergies    Vitals:    20 1008   BP: (!) 130/90   Pulse: 63   Temp: 96.9 °F (36.1 °C)   Weight: 135 lb 6.4 oz (61.4 kg)   Height: 5' 4\" (1.626 m)       Social History     Socioeconomic History    Marital status:       Spouse name: Not on file    Number of children: Not on file    Years of education: Not on file    Highest education level: Not on file   Occupational History    Not on file   Social Needs    Financial resource strain: Not on file    Food insecurity     Worry: Not on file     Inability: Not on file    Transportation needs     Medical: Not on file     Non-medical: Not on file   Tobacco Use    Smoking status: Former Smoker     Packs/day: 0.50     Years: 15.00     Pack years: 7.50     Types: Cigarettes     Last attempt to quit: 2008     Years since quittin.5    Smokeless tobacco: Never Used    Tobacco comment: quit    Substance and Sexual Activity    Alcohol use: No    Drug use: No    Sexual activity: Not on file   Lifestyle    Physical activity     Days per week: Not on file     Minutes per session: Not on file    Stress: Not on file   Relationships    Social connections     Talks on phone: Not on file     Gets together: Not on file     Attends Rastafarian service: Not on file     Active member of club or organization: Not on file     Attends meetings of clubs or organizations: Not on file     Relationship status: Not on file    Intimate partner violence     Fear of current or ex partner: Not on file     Emotionally abused: Not on file     Physically abused: Not on file     Forced sexual activity: Not on file   Other Topics Concern    Not on file   Social History Narrative    Not on file       Family History   Problem Relation Age of Onset    Breast Cancer Mother     Coronary Art Dis Mother     Stroke Father     No Known Problems Sister     No Known Problems Sister     Diabetes Sister     Kidney Disease Sister          SUBJECTIVE: Mariana Covert fine  Neurological: Alert and oriented to person, place, and time. Skin: Skin is warm and dry. No rash noted. Not diaphoretic. No erythema. Psychiatric: Normal mood and affect. Behavior is normal.     EKG:  normal sinus rhythm, 1st degree AV block, PVC, no change. ASSESSMENT AND PLAN:  Patient Active Problem List   Diagnosis    Mixed hyperlipidemia: on high intensity statin, target LDL < 70    Hypertension: at target     Depression    CAD in native artery: multivessel PCI 7/2019 for medical failure. Now free of angina. Ok to stop plavix and continue only ASA          The patient was educated as to the symptoms that would require a prompt return to our office. Return visit in 1 year.     Yuri Heredia M.D  [facility]

## 2021-08-03 ENCOUNTER — OFFICE VISIT (OUTPATIENT)
Dept: CARDIOLOGY CLINIC | Age: 81
End: 2021-08-03
Payer: MEDICARE

## 2021-08-03 VITALS
DIASTOLIC BLOOD PRESSURE: 76 MMHG | RESPIRATION RATE: 18 BRPM | BODY MASS INDEX: 22.64 KG/M2 | HEART RATE: 62 BPM | WEIGHT: 132.6 LBS | HEIGHT: 64 IN | SYSTOLIC BLOOD PRESSURE: 136 MMHG

## 2021-08-03 DIAGNOSIS — I25.10 CAD IN NATIVE ARTERY: ICD-10-CM

## 2021-08-03 DIAGNOSIS — I10 ESSENTIAL HYPERTENSION: Primary | ICD-10-CM

## 2021-08-03 PROCEDURE — 93000 ELECTROCARDIOGRAM COMPLETE: CPT | Performed by: INTERNAL MEDICINE

## 2021-08-03 PROCEDURE — 99213 OFFICE O/P EST LOW 20 MIN: CPT | Performed by: INTERNAL MEDICINE

## 2021-08-03 RX ORDER — ESCITALOPRAM OXALATE 20 MG/1
TABLET ORAL
COMMUNITY
Start: 2021-06-22

## 2021-08-03 RX ORDER — ZOLPIDEM TARTRATE 5 MG/1
5 TABLET ORAL NIGHTLY PRN
COMMUNITY

## 2021-08-03 NOTE — PROGRESS NOTES
Chief Complaint   Patient presents with    Coronary Artery Disease    Hypertension       Patient Active Problem List    Diagnosis Date Noted    CAD in native artery 07/09/2019     Overview Note:     A. PCI 7/9/2019:  RAMYA 3.0 in mid RCA, two RAMYA 2.5 in mid CX. EF 70%      Mixed hyperlipidemia 03/07/2016    Hypertension 03/07/2016    Depression 03/07/2016       Current Outpatient Medications   Medication Sig Dispense Refill    zolpidem (AMBIEN) 5 MG tablet Take 5 mg by mouth nightly as needed for Sleep.  escitalopram (LEXAPRO) 20 MG tablet take 1 tablet by mouth once daily      atenolol-chlorthalidone (TENORETIC) 50-25 MG per tablet Take 1 tablet by mouth daily 90 tablet 0    HYDROcodone-acetaminophen (NORCO) 5-325 MG per tablet Take 1 tablet by mouth daily as needed for Pain for up to 30 days. 30 tablet 0    atorvastatin (LIPITOR) 80 MG tablet Take 1 tablet by mouth daily 30 tablet 5    Potassium 95 MG TABS Take 1 tablet by mouth every other day       vitamin B-12 (CYANOCOBALAMIN) 1000 MCG tablet Take 1,000 mcg by mouth daily      Cyanocobalamin 1000 MCG/ML KIT Inject as directed every 30 days Receives at Dr. Garrido December office      fluticasone (FLONASE) 50 MCG/ACT nasal spray 2 sprays by Nasal route daily 1 Bottle 1    multivitamin (THERAGRAN) per tablet Take 1 tablet by mouth daily. Last dose to be 1-23-13      aspirin 81 MG tablet Take 81 mg by mouth daily. Last taken 1-20-13      Calcium Carbonate-Vit D-Min (CALCIUM 1200 PO) Take 1 tablet by mouth daily. No current facility-administered medications for this visit. No Known Allergies    Vitals:    08/03/21 1109   BP: 136/76   Pulse: 62   Resp: 18   Weight: 132 lb 9.6 oz (60.1 kg)   Height: 5' 4\" (1.626 m)       Social History     Socioeconomic History    Marital status:       Spouse name: Not on file    Number of children: Not on file    Years of education: Not on file    Highest education level: Not on file Occupational History    Not on file   Tobacco Use    Smoking status: Former Smoker     Packs/day: 0.50     Years: 15.00     Pack years: 7.50     Types: Cigarettes     Quit date: 2008     Years since quittin.5    Smokeless tobacco: Never Used    Tobacco comment: quit    Vaping Use    Vaping Use: Never used   Substance and Sexual Activity    Alcohol use: Yes     Comment: socially    Drug use: No    Sexual activity: Not on file   Other Topics Concern    Not on file   Social History Narrative    Not on file     Social Determinants of Health     Financial Resource Strain:     Difficulty of Paying Living Expenses:    Food Insecurity:     Worried About Running Out of Food in the Last Year:     [address] in the Last Year:    Transportation Needs:     Lack of Transportation (Medical):      Lack of Transportation (Non-Medical):    Physical Activity:     Days of Exercise per Week:     Minutes of Exercise per Session:    Stress:     Feeling of Stress :    Social Connections:     Frequency of Communication with Friends and Family:     Frequency of Social Gatherings with Friends and Family:     Attends Yarsanism Services:     Active Member of Clubs or Organizations:     Attends Club or Organization Meetings:     Marital Status:    Intimate Partner Violence:     Fear of Current or Ex-Partner:     Emotionally Abused:     Physically Abused:     Sexually Abused:        Family History   Problem Relation Age of Onset    Breast Cancer Mother     Coronary Art Dis Mother     Stroke Father     No Known Problems Sister     No Known Problems Sister     Diabetes Sister     Kidney Disease Sister          SUBJECTIVE: Mace Sonia presents to the office today for followup of chronic cardiac diagnoses and multivessel PCI  She complains of NO  angina and denies   exertional chest pressure/discomfort, lower extremity edema, near-syncope, orthopnea, palpitations, paroxysmal nocturnal dyspnea and syncope. , cares for entire [place] which has 13 steps,  including her daughter's 3 story home, cuts the grass and does all her own yard work. Review of Systems:   Heart: as above   Lungs: as above   Eyes: denies changes in vision or discharge. Ears: denies changes in hearing or pain. Nose: denies epistaxis or masses   Throat: denies sore throat or trouble swallowing. Neuro: denies numbness, tingling, tremors. Skin: denies rashes or itching. : denies hematuria, dysuria   GI: denies vomiting, diarrhea   Psych: denies mood changed, anxiety, depression. all others negative. Physical Exam   /76   Pulse 62   Resp 18   Ht 5' 4\" (1.626 m)   Wt 132 lb 9.6 oz (60.1 kg)   BMI 22.76 kg/m²   Constitutional: Oriented to person, place, and time. Well-developed and well-nourished. No distress. Head: Normocephalic and atraumatic. Eyes: EOM are normal. Pupils are equal, round, and reactive to light. Neck: Normal range of motion. Neck supple. No hepatojugular reflux and no JVD present. Carotid bruit is not present. No tracheal deviation present. No thyromegaly present. Cardiovascular: Normal rate, regular rhythm, normal heart sounds and intact distal pulses. Exam reveals no gallop and no friction rub. No murmur heard. Pulmonary/Chest: Effort normal and breath sounds normal. No respiratory distress. No wheezes. No rales. No tenderness. Abdominal: Soft. Bowel sounds are normal. No distension and no mass. No tenderness. No rebound and no guarding. Musculoskeletal: Normal range of motion. No edema and no tenderness. radial pulse fine  Neurological: Alert and oriented to person, place, and time. Skin: Skin is warm and dry. No rash noted. Not diaphoretic. No erythema. Psychiatric: Normal mood and affect. Behavior is normal.     EKG:  normal sinus rhythm, , rate 62, 1st degree AV block,  no change.     ASSESSMENT AND PLAN:  Patient Active Problem List   Diagnosis    Mixed hyperlipidemia: on high intensity statin, target LDL < 70    Hypertension: at target     Depression    CAD in native artery: multivessel PCI 7/2019 for medical failure. free of angina. ekg no change         The patient was educated as to the symptoms that would require a prompt return to our office. Return visit in 1 year.     Evert Horowitz M.D  [facility]

## 2023-03-13 ENCOUNTER — OFFICE VISIT (OUTPATIENT)
Dept: CARDIOLOGY CLINIC | Age: 83
End: 2023-03-13
Payer: MEDICARE

## 2023-03-13 VITALS
HEART RATE: 62 BPM | SYSTOLIC BLOOD PRESSURE: 142 MMHG | RESPIRATION RATE: 12 BRPM | HEIGHT: 64 IN | BODY MASS INDEX: 24.41 KG/M2 | WEIGHT: 143 LBS | DIASTOLIC BLOOD PRESSURE: 76 MMHG

## 2023-03-13 DIAGNOSIS — I25.10 CAD IN NATIVE ARTERY: Primary | ICD-10-CM

## 2023-03-13 PROCEDURE — 3078F DIAST BP <80 MM HG: CPT | Performed by: INTERNAL MEDICINE

## 2023-03-13 PROCEDURE — 3077F SYST BP >= 140 MM HG: CPT | Performed by: INTERNAL MEDICINE

## 2023-03-13 PROCEDURE — 99213 OFFICE O/P EST LOW 20 MIN: CPT | Performed by: INTERNAL MEDICINE

## 2023-03-13 PROCEDURE — 93000 ELECTROCARDIOGRAM COMPLETE: CPT | Performed by: INTERNAL MEDICINE

## 2023-03-13 PROCEDURE — 1123F ACP DISCUSS/DSCN MKR DOCD: CPT | Performed by: INTERNAL MEDICINE

## 2023-03-13 RX ORDER — ZOLPIDEM TARTRATE 10 MG/1
TABLET ORAL
COMMUNITY
Start: 2023-02-03

## 2023-03-13 NOTE — PROGRESS NOTES
Chief Complaint   Patient presents with    Coronary Artery Disease       Patient Active Problem List    Diagnosis Date Noted    CAD in native artery 07/09/2019     Overview Note:     A. PCI 7/9/2019:  RAMYA 3.0 in mid RCA, two RAMYA 2.5 in mid CX. EF 70%      Mixed hyperlipidemia 03/07/2016    Hypertension 03/07/2016    Depression 03/07/2016       Current Outpatient Medications   Medication Sig Dispense Refill    zolpidem (AMBIEN) 10 MG tablet take 1 tablet by mouth once daily      escitalopram (LEXAPRO) 20 MG tablet take 1 tablet by mouth once daily      atenolol-chlorthalidone (TENORETIC) 50-25 MG per tablet Take 1 tablet by mouth daily 90 tablet 0    HYDROcodone-acetaminophen (NORCO) 5-325 MG per tablet Take 1 tablet by mouth daily as needed for Pain for up to 30 days. 30 tablet 0    atorvastatin (LIPITOR) 80 MG tablet Take 1 tablet by mouth daily 30 tablet 5    Potassium 95 MG TABS Take 1 tablet by mouth every other day       vitamin B-12 (CYANOCOBALAMIN) 1000 MCG tablet Take 1,000 mcg by mouth daily      Cyanocobalamin 1000 MCG/ML KIT Inject as directed every 30 days Receives at Dr. Emma Liang office      fluticasone (FLONASE) 50 MCG/ACT nasal spray 2 sprays by Nasal route daily 1 Bottle 1    multivitamin (THERAGRAN) per tablet Take 1 tablet by mouth daily. Last dose to be 1-23-13      aspirin 81 MG tablet Take 81 mg by mouth daily. Last taken 1-20-13      Calcium Carbonate-Vit D-Min (CALCIUM 1200 PO) Take 1 tablet by mouth daily. No current facility-administered medications for this visit. No Known Allergies    There were no vitals filed for this visit. SUBJECTIVE: Toño Hernandez presents to the office today for followup of chronic cardiac diagnoses and multivessel PCI  She complains of NO  angina and denies   exertional chest pressure/discomfort, lower extremity edema, near-syncope, orthopnea, palpitations, paroxysmal nocturnal dyspnea and syncope.   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 She does find she is more fatigued, and tougher to go up stairs    Has a nodule detected on CXR and is undergoing w/u at [facility] - possibly adenocarcinoma per pulmonary note - bx pending                  Physical Exam   There were no vitals taken for this visit. Constitutional: Oriented to person, place, and time. Well-developed and well-nourished. No distress. Neck: no JVD present. Carotid bruit is not present. Cardiovascular: Normal rate, regular rhythm, normal heart sounds and intact distal pulses. Exam reveals no gallop and no friction rub. No murmur heard. Pulmonary/Chest: Effort normal and breath sounds normal. No respiratory distress. No wheezes. No rales. Abdominal: Soft. Bowel sounds are normal.   Musculoskeletal:  No edema   Neurological: Alert and oriented to person, place, and time. Skin: Skin is warm and dry. Psychiatric: Normal mood and affect. Behavior is normal.     EKG:  normal sinus rhythm, 1rst degree AV block,  no change. ASSESSMENT AND PLAN:  Patient Active Problem List   Diagnosis    Mixed hyperlipidemia: on high intensity statin, target LDL < 70    Hypertension: at target     Depression    CAD in native artery: multivessel PCI 7/2019 for medical failure. free of angina. ekg no change     As above  Newly detected lung nodule , [facility] w/u in progress  No contraindication to lung procedure    The patient was educated as to the symptoms that would require a prompt return to our office. Return visit in 1 year.     Dasia Velarde M.D  [facility]

## 2024-03-12 ENCOUNTER — OFFICE VISIT (OUTPATIENT)
Dept: CARDIOLOGY CLINIC | Age: 84
End: 2024-03-12
Payer: MEDICARE

## 2024-03-12 VITALS
DIASTOLIC BLOOD PRESSURE: 78 MMHG | SYSTOLIC BLOOD PRESSURE: 132 MMHG | WEIGHT: 146 LBS | RESPIRATION RATE: 18 BRPM | HEIGHT: 64 IN | BODY MASS INDEX: 24.92 KG/M2 | HEART RATE: 78 BPM

## 2024-03-12 DIAGNOSIS — I25.10 CAD IN NATIVE ARTERY: Primary | ICD-10-CM

## 2024-03-12 PROBLEM — R91.8 LUNG MASS: Status: ACTIVE | Noted: 2024-03-12

## 2024-03-12 PROBLEM — C34.90 LUNG CANCER (HCC): Status: ACTIVE | Noted: 2024-03-12

## 2024-03-12 PROCEDURE — 1123F ACP DISCUSS/DSCN MKR DOCD: CPT | Performed by: INTERNAL MEDICINE

## 2024-03-12 PROCEDURE — 99213 OFFICE O/P EST LOW 20 MIN: CPT | Performed by: INTERNAL MEDICINE

## 2024-03-12 PROCEDURE — 93000 ELECTROCARDIOGRAM COMPLETE: CPT | Performed by: INTERNAL MEDICINE

## 2024-03-12 PROCEDURE — 3075F SYST BP GE 130 - 139MM HG: CPT | Performed by: INTERNAL MEDICINE

## 2024-03-12 PROCEDURE — 3078F DIAST BP <80 MM HG: CPT | Performed by: INTERNAL MEDICINE

## 2024-03-12 NOTE — PROGRESS NOTES
She complains of NO  angina and denies   exertional chest pressure/discomfort, lower extremity edema, near-syncope, orthopnea, palpitations, paroxysmal nocturnal dyspnea and syncope.  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 Well-developed and well-nourished. No distress.      Neck: no JVD present. Carotid bruit is not present.  Cardiovascular: Normal rate, regular rhythm, normal heart sounds and intact distal pulses.  Exam reveals no gallop and no friction rub.  No murmur heard.  Pulmonary/Chest: Effort normal and breath sounds normal. No respiratory distress. No wheezes. No rales.  Abdominal: Soft. Bowel sounds are normal.   Musculoskeletal:  No edema   Neurological: Alert and oriented to person, place, and time.   Skin: Skin is warm and dry.   Psychiatric: Normal mood and affect. Behavior is normal.     EKG:  normal sinus rhythm, 1rst degree AV block,  no change.    ASSESSMENT AND PLAN:  Patient Active Problem List   Diagnosis    Mixed hyperlipidemia: on high intensity statin, target LDL < 55    Hypertension: at target     Depression    CAD in native artery: multivessel PCI 7/2019 for medical failure.  free of angina. ekg no change         The patient was educated as to the symptoms that would require a prompt return to our office.  Return visit in 1 year.    Luis Alberto Prakash M.D  [facility]

## 2025-03-13 ENCOUNTER — OFFICE VISIT (OUTPATIENT)
Dept: CARDIOLOGY CLINIC | Age: 85
End: 2025-03-13
Payer: MEDICARE

## 2025-03-13 VITALS
HEIGHT: 64 IN | WEIGHT: 150 LBS | SYSTOLIC BLOOD PRESSURE: 124 MMHG | HEART RATE: 69 BPM | DIASTOLIC BLOOD PRESSURE: 65 MMHG | BODY MASS INDEX: 25.61 KG/M2 | RESPIRATION RATE: 16 BRPM

## 2025-03-13 DIAGNOSIS — I25.10 CAD IN NATIVE ARTERY: Primary | ICD-10-CM

## 2025-03-13 PROCEDURE — 1160F RVW MEDS BY RX/DR IN RCRD: CPT | Performed by: INTERNAL MEDICINE

## 2025-03-13 PROCEDURE — 99213 OFFICE O/P EST LOW 20 MIN: CPT | Performed by: INTERNAL MEDICINE

## 2025-03-13 PROCEDURE — 1123F ACP DISCUSS/DSCN MKR DOCD: CPT | Performed by: INTERNAL MEDICINE

## 2025-03-13 PROCEDURE — 1159F MED LIST DOCD IN RCRD: CPT | Performed by: INTERNAL MEDICINE

## 2025-03-13 PROCEDURE — 3074F SYST BP LT 130 MM HG: CPT | Performed by: INTERNAL MEDICINE

## 2025-03-13 PROCEDURE — 3078F DIAST BP <80 MM HG: CPT | Performed by: INTERNAL MEDICINE

## 2025-03-13 PROCEDURE — 93000 ELECTROCARDIOGRAM COMPLETE: CPT | Performed by: INTERNAL MEDICINE

## 2025-03-13 NOTE — PROGRESS NOTES
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      6/22/2023: [facility]  Right VATS Upper Lobectomy, Mediastinal Lymphadenectomy      CAD in native artery 07/09/2019     Overview Note:     A. PCI 7/9/2019:  RAMYA 3.0 in mid RCA, two RAMYA 2.5 in mid CX.  EF 70%      Mixed hyperlipidemia 03/07/2016    Hypertension 03/07/2016    Depression 03/07/2016       Current Outpatient Medications   Medication Sig Dispense Refill    zolpidem (AMBIEN) 10 MG tablet take 1 tablet by mouth once daily      escitalopram (LEXAPRO) 20 MG tablet take 1 tablet by mouth once daily      atenolol-chlorthalidone (TENORETIC) 50-25 MG per tablet Take 1 tablet by mouth daily 90 tablet 0    Potassium 95 MG TABS Take 1 tablet by mouth every other day       vitamin B-12 (CYANOCOBALAMIN) 1000 MCG tablet Take 1 tablet by mouth daily      Cyanocobalamin 1000 MCG/ML KIT Inject as directed every 30 days Receives at Dr. Vásquez's office      fluticasone (FLONASE) 50 MCG/ACT nasal spray 2 sprays by Nasal route daily 1 Bottle 1    multivitamin (THERAGRAN) per tablet Take 1 tablet by mouth daily Last dose to be 1-23-13      aspirin 81 MG tablet Take 1 tablet by mouth daily Last taken 1-20-13      Calcium Carbonate-Vit D-Min (CALCIUM 1200 PO) Take 1 tablet by mouth daily.      atorvastatin (LIPITOR) 80 MG tablet Take 1 tablet by mouth daily 30 tablet 5     No current facility-administered medications for this visit.        No Known Allergies    Vitals:    03/13/25 1107   BP: 124/65   Pulse: 69   Resp: 16   Weight: 68 kg (150 lb)   Height: 1.626 m (5' 4\")                 SUBJECTIVE: Mariluz Gusman presents to the office today for followup of chronic cardiac diagnoses and multivessel PCI   Since our last visit has had reassuring CTs for  her adenocarcinoma      She